# Patient Record
Sex: FEMALE | URBAN - METROPOLITAN AREA
[De-identification: names, ages, dates, MRNs, and addresses within clinical notes are randomized per-mention and may not be internally consistent; named-entity substitution may affect disease eponyms.]

---
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
## 2025-07-15 ENCOUNTER — APPOINTMENT (OUTPATIENT)
Dept: URBAN - METROPOLITAN AREA CLINIC 158 | Facility: CLINIC | Age: 20
Setting detail: DERMATOLOGY
End: 2025-07-15

## 2025-07-15 DIAGNOSIS — L20.89 OTHER ATOPIC DERMATITIS: ICD-10-CM

## 2025-07-15 DIAGNOSIS — L70.0 ACNE VULGARIS: ICD-10-CM

## 2025-07-15 PROCEDURE — ? PRESCRIPTION

## 2025-07-15 PROCEDURE — ? PATIENT SPECIFIC COUNSELING

## 2025-07-15 RX ORDER — CLINDAMYCIN PHOSPHATE 10 MG/ML
1 LOTION TOPICAL BID
Qty: 60 | Refills: 12 | Status: ERX

## 2025-07-15 RX ORDER — TRIAMCINOLONE ACETONIDE 1 MG/G
1 CREAM TOPICAL BID
Qty: 30 | Refills: 12 | Status: ERX

## 2025-07-15 RX ORDER — TRETIONIN 0.5 MG/G
1 CREAM TOPICAL NIGHTLY
Qty: 45 | Refills: 12 | Status: ERX

## 2025-07-15 ASSESSMENT — LOCATION SIMPLE DESCRIPTION DERM
LOCATION SIMPLE: LEFT UPPER ARM
LOCATION SIMPLE: LEFT FOREHEAD
LOCATION SIMPLE: RIGHT FOREHEAD
LOCATION SIMPLE: RIGHT UPPER ARM
LOCATION SIMPLE: LEFT ANTERIOR NECK
LOCATION SIMPLE: RIGHT CHEEK
LOCATION SIMPLE: LEFT CHEEK
LOCATION SIMPLE: RIGHT ANTERIOR NECK

## 2025-07-15 ASSESSMENT — LOCATION DETAILED DESCRIPTION DERM
LOCATION DETAILED: RIGHT MEDIAL MALAR CHEEK
LOCATION DETAILED: LEFT INFERIOR FOREHEAD
LOCATION DETAILED: RIGHT INFERIOR MEDIAL FOREHEAD
LOCATION DETAILED: RIGHT ANTECUBITAL SKIN
LOCATION DETAILED: RIGHT INFERIOR LATERAL NECK
LOCATION DETAILED: LEFT INFERIOR LATERAL NECK
LOCATION DETAILED: LEFT MEDIAL MALAR CHEEK
LOCATION DETAILED: LEFT ANTECUBITAL SKIN
LOCATION DETAILED: LEFT INFERIOR MEDIAL FOREHEAD
LOCATION DETAILED: RIGHT FOREHEAD

## 2025-07-15 ASSESSMENT — LOCATION ZONE DERM
LOCATION ZONE: ARM
LOCATION ZONE: NECK
LOCATION ZONE: FACE

## 2025-07-15 NOTE — PROCEDURE: PATIENT SPECIFIC COUNSELING
Detail Level: Simple
Carry forward from 12/22/2022: Patient has mild acne on the face. She has not had any prescription treatment. I recommend starting tretinoin 0.05% cream QHS and washing with either Panoxyl wash or CeraVe acne cleanser.  She can use Neutrogena Hydroboost gel for a moisturizer; samples given. Return to clinic in 3 months.\\n\\nCarry forward from (Mar 23 2023): Acne is stable. There is only active areas on the glabella. Tretinoin cream is too irritating with her eczema. I will switch her to clindamycin lotion.\\n\\nCarry forward from (Jun 27 2023): The patient mainly has comedonal acne on the face.  Acne surgery performed on the cheeks and temples today.  I will add adapalene 0.3% gel QD which she can use with clindamycin 1% lotion.  I also recommended Neutrogena Hydroboost gel with suncreen; sample given. Return to clinic in 6 months.\\n\\nCarry forward from (Geo 15 2024): The patient was accompanied by her mother today. The patient mainly has comedonal acne on the face.  Acne surgery was performed on the forehead and cheeks today.  She reports that she stopped using adapalene 0.3% gel due to irritation.  She uses tretinoin 0.05% cream about twice a week.  I encourage her to increase the frequency of use of the tretinoin 0.05% cream to at least 3-4 times a week. She should continue using Neutrogena Hydroboost gel for moisturizing.  Return to clinic in 6 months before going to college in [facility].\\n\\nCarry forward from (Jul 13 2024):  The patient was accompanied by her mother today. The patient mainly has comedonal acne on the face.  Acne surgery was performed on the forehead and cheeks today. She should continue the use of tretinoin 0.05% cream; refills given.  Return to clinic in 1 year as she is going to college in [place].\\n\\nTODAY's note (Jul 15 2025): he patient was accompanied by her mother today. The patient mainly has comedonal acne on the face.  Acne surgery was performed. She should continue the use of tretinoin 0.05% cream and clindamycin lotion; refills given.  Recommend washing with Neutrogena oil-free acne wash; sample given. Return to clinic in 1 year as she is going to [employer] school in [place].
Detail Level: Generalized
The patient/mother requested a refill of triamcinolone 0.1% cream for her eczema.